# Patient Record
Sex: FEMALE | Race: WHITE | NOT HISPANIC OR LATINO | Employment: FULL TIME | URBAN - METROPOLITAN AREA
[De-identification: names, ages, dates, MRNs, and addresses within clinical notes are randomized per-mention and may not be internally consistent; named-entity substitution may affect disease eponyms.]

---

## 2017-05-19 ENCOUNTER — OFFICE VISIT (OUTPATIENT)
Dept: URGENT CARE | Facility: PHYSICIAN GROUP | Age: 57
End: 2017-05-19
Payer: COMMERCIAL

## 2017-05-19 VITALS
WEIGHT: 180 LBS | DIASTOLIC BLOOD PRESSURE: 84 MMHG | BODY MASS INDEX: 28.19 KG/M2 | SYSTOLIC BLOOD PRESSURE: 122 MMHG | OXYGEN SATURATION: 95 % | HEART RATE: 80 BPM | RESPIRATION RATE: 18 BRPM | TEMPERATURE: 98.4 F

## 2017-05-19 DIAGNOSIS — N39.0 ACUTE URINARY TRACT INFECTION: ICD-10-CM

## 2017-05-19 LAB
APPEARANCE UR: CLEAR
BILIRUB UR STRIP-MCNC: NORMAL MG/DL
COLOR UR AUTO: YELLOW
GLUCOSE UR STRIP.AUTO-MCNC: NORMAL MG/DL
KETONES UR STRIP.AUTO-MCNC: NORMAL MG/DL
LEUKOCYTE ESTERASE UR QL STRIP.AUTO: NORMAL
NITRITE UR QL STRIP.AUTO: NORMAL
PH UR STRIP.AUTO: 6 [PH] (ref 5–8)
PROT UR QL STRIP: NORMAL MG/DL
RBC UR QL AUTO: NORMAL
SP GR UR STRIP.AUTO: 1.01
UROBILINOGEN UR STRIP-MCNC: NORMAL MG/DL

## 2017-05-19 PROCEDURE — 99214 OFFICE O/P EST MOD 30 MIN: CPT | Performed by: FAMILY MEDICINE

## 2017-05-19 PROCEDURE — 81002 URINALYSIS NONAUTO W/O SCOPE: CPT | Performed by: FAMILY MEDICINE

## 2017-05-19 RX ORDER — CHLORAL HYDRATE 500 MG
1000 CAPSULE ORAL
COMMUNITY
End: 2019-06-13

## 2017-05-19 RX ORDER — CIPROFLOXACIN 500 MG/1
TABLET, FILM COATED ORAL
Qty: 14 TAB | Refills: 0 | Status: SHIPPED | OUTPATIENT
Start: 2017-05-19 | End: 2019-06-13

## 2017-05-19 NOTE — PROGRESS NOTES
Chief Complaint:    Chief Complaint   Patient presents with   • Dysuria     x 3 days       History of Present Illness:    This is a new problem. Symptoms x 3 days. Has discomfort in bladder region, dysuria at end of urination, urinary frequency, and urinary urgency. No gross hematuria. Feels like typical UTI. 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 She was appropriately treated for UTI 10/24/16 with Bactrim but developed rash and then was rx'd Cipro to replace Bactrim. Cipro worked/was tolerated.    Positive urine culture 10/24/16 noted below:     URINE CULTURE(NEW) (Order #[id number]) on 10/24/16       Result Notes      Notes Recorded by Chris Damon PA-C on 10/27/2016 at 4:27 PM  Pt on appropriate antibiotic                Component Results      Component     Significant Indicator     POS     Source     UR     Urine Culture (Abnormal)     Mixed skin angelica 10-50,000 cfu/mL     Urine Culture (Abnormal)     Escherichia coli   ,000 cfu/mL          Narrative      Patient weight (in lbs)->0  Total urine volume units?->ML       Lab Information      Lab     [facility]                  Last Resulted Time     Thu Oct 27, 2016  8:03 AM       Detailed Information      Priority and Order Details Collection Information       Collection Information      Collected: 10/24/2016  4:20 PM    Resulting Agency: [facility]          Culture & Susceptibility      ESCHERICHIA COLI      Antibiotic Sensitivity Microscan Unit Status     Ampicillin Sensitive <=8 mcg/mL Final     Cefepime Sensitive <=8 mcg/mL Final     Cefotaxime Sensitive <=2 mcg/mL Final     Cefotetan Sensitive <=16 mcg/mL Final     Ceftazidime Sensitive <=1 mcg/mL Final     Ceftriaxone Sensitive <=8 mcg/mL Final     Cefuroxime Sensitive 8 mcg/mL Final     Cephalothin Sensitive <=8 mcg/mL Final     Ciprofloxacin Sensitive <=1 mcg/mL Final     Gentamicin Sensitive <=4 mcg/mL  Final     Levofloxacin Sensitive <=2 mcg/mL Final     Nitrofurantoin Sensitive <=32 mcg/mL Final     Pip/Tazobactam Sensitive <=16 mcg/mL Final     Piperacillin Sensitive <=16 mcg/mL Final     Tigecycline Sensitive <=2 mcg/mL Final     Tobramycin Sensitive <=4 mcg/mL Final     Trimeth/Sulfa Sensitive <=2/38 mcg/mL Final                   Review of Systems:    Constitutional: Negative for fever, chills, and diaphoresis.   Eyes: Negative for change in vision, photophobia, pain, redness, and discharge.  ENT: Negative for ear pain, ear discharge, hearing loss, tinnitus, nasal congestion, nosebleeds, and sore throat.    Respiratory: Negative for cough, hemoptysis, sputum production, shortness of breath, wheezing, and stridor.    Cardiovascular: Negative for chest pain, palpitations, orthopnea, claudication, leg swelling, and PND.   Gastrointestinal: Negative for abdominal pain, nausea, vomiting, diarrhea, constipation, blood in stool, and melena.   Genitourinary: See HPI.  Musculoskeletal: Negative for myalgias, joint pain, neck pain, and back pain.   Skin: Negative for rash and itching.   Neurological: Negative for dizziness, tingling, tremors, sensory change, speech change, focal weakness, seizures, loss of consciousness, and headaches.   Endo: Negative for polydipsia.   Heme: Does not bruise/bleed easily.   Psychiatric/Behavioral: No new symptoms.    Past Medical History:    Past Medical History   Diagnosis Date   • Unspecified urinary incontinence    • Other specified symptom associated with female genital organs    • Renal disorder      not an issue at this (from NSAIDS 2010)   • Psychiatric problem      depression       Past Surgical History:    Past Surgical History   Procedure Laterality Date   • Other  1968     T&A uvula removal   • Hysterectomy robotic  4/9/2013     Performed by Juanita Wade M.D. at SURGERY [facility]   • Bladder sling female  4/9/2013     Performed by Juanita Wade M.D. at  SURGERY [facility]S   • Cystoscopy  4/9/2013     Performed by Juanita Wade M.D. at SURGERY [facility]   • Appendectomy laparoscopic  11/13/2014     Performed by Alethea Mike M.D. at SURGERY [facility]       Social History:    Social History     Social History   • Marital Status:      Spouse Name: N/A   • Number of Children: N/A   • Years of Education: N/A     Occupational History   • Not on file.     Social History Main Topics   • Smoking status: Former Smoker -- 0.50 packs/day for 10 years     Types: Cigarettes     Quit date: 01/01/1992   • Smokeless tobacco: Not on file   • Alcohol Use: Yes      Comment: 0-2 per week   • Drug Use: No   • Sexual Activity: Not on file     Other Topics Concern   • Not on file     Social History Narrative       Family History:    History reviewed. No pertinent family history.    Medications:    Current Outpatient Prescriptions on File Prior to Visit   Medication Sig Dispense Refill   • ESTROGENS CONJUGATED PO Take 1 mg by mouth. estrogile     • Multiple Vitamin (MULTI-VITAMIN PO) Take  by mouth every day.     • Flaxseed, Linseed, (FLAX SEED OIL PO) Take 1 Cap by mouth every day.     • duloxetine (CYMBALTA) 20 MG CPEP Take 60 mg by mouth every day.     • cetirizine (ZYRTEC) 10 MG TABS Take 10 mg by mouth every day.     • montelukast (SINGULAIR) 10 MG TABS Take 10 mg by mouth every day.       No current facility-administered medications on file prior to visit.       Allergies:    Allergies   Allergen Reactions   • Bactrim [Sulfamethoxazole W-Trimethoprim] Rash     Hands to forearms B/L rash since starting Bactrim   • Codeine Vomiting   • Vicodin [Hydrocodone-Acetaminophen] Vomiting   • Erythromycin Vomiting   • Nsaids      Decreased kidney function         Vitals:    Filed Vitals:    05/19/17 0950   BP: 122/84   Pulse: 80   Temp: 36.9 °C (98.4 °F)   Resp: 18   Weight: 81.647 kg (180 lb)   SpO2: 95%       Physical Exam:    Constitutional: Vital signs  reviewed. Appears well-developed and well-nourished. No acute distress.   Eyes: Sclera white, conjunctivae clear.   ENT: External ears normal. Hearing normal.  Neck: Neck supple.   Pulmonary/Chest: Respirations non-labored.   Abdomen: Bowel sounds are normal active. Soft, non-distended, and non-tender to palpation.    Musculoskeletal: No CVA TTP bilaterally. Normal gait. Normal range of motion. No muscular atrophy or weakness.  Neurological: Alert and oriented to person, place, and time. Muscle tone normal. Coordination normal. Light touch and sensation normal.   Skin: No rashes or lesions. Warm, dry, normal turgor.  Psychiatric: Normal mood and affect. Behavior is normal. Judgment and thought content normal.     Diagnostics:     POCT URINALYSIS (Order #[id number]) on 5/19/17       Component Results      Component Value Ref Range & Units Status     POC Color yellow Negative Final     POC Appearance clear Negative Final     POC Leukocyte Esterase neg Negative Final     POC Nitrites neg Negative Final     POC Urobiligen neg Negative (0.2) mg/dL Final     POC Protein neg Negative mg/dL Final     POC Urine PH 6.0 5.0 - 8.0 Final     POC Blood trace Negative Final     POC Specific Gravity 1.010 <1.005 - >1.030 Final     POC Ketones neg Negative mg/dL Final     POC Biliruben neg Negative mg/dL Final     POC Glucose neg Negative mg/dL Final         Last Resulted Time     Fri May 19, 2017 10:08 AM       Assessment / Plan:    1. Acute urinary tract infection  - POCT Urinalysis  - ciprofloxacin (CIPRO) 500 MG Tab; 1 TAB TWICE A DAY X 5-7 DAYS.  Dispense: 14 Tab; Refill: 0      Discussed with her limitations of U. dip, DDX, and management options.    Despite not overly remarkable U. dip today, she has all the right symptoms for UTI, and I rec'd treat with antibiotic. She agrees.    She was appropriately treated for UTI 10/24/16 with Bactrim but developed rash and then was rx'd Cipro to replace Bactrim. Cipro worked/was  tolerated.    As she tolerated Cipro, will go with Cipro as there is potential for adverse reaction to other med she has not had in past.    Agreeable to medication prescribed.    Declines urine culture.    Follow-up with PCP or urgent care if getting worse or not better with above.

## 2017-05-19 NOTE — MR AVS SNAPSHOT
Reta Merrill Horvath   2017 9:30 AM   Office Visit   MRN: [id number]    Department:  [facility]   Dept Phone:  [phone]    Description:  Female : 1960   Provider:  Jovan Ruiz M.D.           Reason for Visit     Dysuria x 3 days      Allergies as of 2017     Allergen Noted Reactions    Bactrim [Sulfamethoxazole W-Trimethoprim] 10/26/2016   Rash    Hands to forearms B/L rash since starting Bactrim    Codeine 2012   Vomiting    Vicodin [Hydrocodone-Acetaminophen] 2012   Vomiting    Erythromycin 2013   Vomiting    Nsaids 2013       Decreased kidney function      You were diagnosed with     Acute urinary tract infection   [609905]         Vital Signs     Blood Pressure Pulse Temperature Respirations Weight Oxygen Saturation    122/84 mmHg 80 36.9 °C (98.4 °F) 18 81.647 kg (180 lb) 95%    Smoking Status                   Former Smoker           Basic Information     Date Of Birth Sex Race Ethnicity Preferred Language    1960 Female White Non- English      Problem List              ICD-10-CM Priority Class Noted - Resolved    Health examination of defined subpopulation Z02.89   2012 - Present    Fibroid uterus D25.9   2013 - Present      Health Maintenance        Date Due Completion Dates    IMM DTaP/Tdap/Td Vaccine (1 - Tdap) 1981 ---    PAP SMEAR 1983 ---    MAMMOGRAM 12/10/2016 12/10/2015, 2015, 2015, 2011    COLONOSCOPY 10/31/2024 10/31/2014            Current Immunizations     Influenza TIV (IM) 10/10/2013, 10/4/2012    Influenza Vaccine Adult HD 10/10/2013    Influenza Vaccine Quad Inj (Pf) 2016, 2014    Influenza Vaccine Quad Inj (Preserved) 10/30/2015    Tuberculin Skin Test 3/17/2017, 3/21/2016, 2015  8:05 AM, 2014 10:40 AM, 2013 11:17 AM, 2013  9:52 AM, 8/15/2012  4:05 PM, 2012  3:28 PM      Below and/or attached are the medications your provider expects you to take. Review all  of your home medications and newly ordered medications with your provider and/or pharmacist. Follow medication instructions as directed by your provider and/or pharmacist. Please keep your medication list with you and share with your provider. Update the information when medications are discontinued, doses are changed, or new medications (including over-the-counter products) are added; and carry medication information at all times in the event of emergency situations     Allergies:  BACTRIM - Rash     CODEINE - Vomiting     VICODIN - Vomiting     ERYTHROMYCIN - Vomiting     NSAIDS - (reactions not documented)               Medications  Valid as of: May 19, 2017 - 10:07 AM    Generic Name Brand Name Tablet Size Instructions for use    Calcium-Magnesium-Vitamin D   Take  by mouth.        Cetirizine HCl (Tab) ZYRTEC 10 MG Take 10 mg by mouth every day.        Ciprofloxacin HCl (Tab) CIPRO 500 MG 1 TAB TWICE A DAY X 5-7 DAYS.        DULoxetine HCl (Cap DR Particles) CYMBALTA 20 MG Take 60 mg by mouth every day.        Estrogens Conjugated   Take 1 mg by mouth. estrogile        Flaxseed (Linseed)   Take 1 Cap by mouth every day.        Montelukast Sodium (Tab) SINGULAIR 10 MG Take 10 mg by mouth every day.        Multiple Vitamin   Take  by mouth every day.        Omega-3 Fatty Acids (Cap) fish oil 1000 MG Take 1,000 mg by mouth 3 times a day, with meals.        .                 Medicines prescribed today were sent to:     [facility] [place] - [address]    [address] [facility] [address]    Phone: [phone] Fax: [phone]    Open 24 Hours?: No    [id number] [address] [place] [address] [address]    Phone: [phone] Fax: [phone]    Open 24 Hours?: No      Medication refill instructions:       If your prescription bottle indicates you have medication refills left, it is not necessary to call your  provider’s office. Please contact your pharmacy and they will refill your medication.    If your prescription bottle indicates you do not have any refills left, you may request refills at any time through one of the following ways: The online [facility] system (except Urgent Care), by calling your provider’s office, or by asking your pharmacy to contact your provider’s office with a refill request. Medication refills are processed only during regular business hours and may not be available until the next business day. Your provider may request additional information or to have a follow-up visit with you prior to refilling your medication.   *Please Note: Medication refills are assigned a new Rx number when refilled electronically. Your pharmacy may indicate that no refills were authorized even though a new prescription for the same medication is available at the pharmacy. Please request the medicine by name with the pharmacy before contacting your provider for a refill.           [facility] Access Code: Activation code not generated  Current [facility] Status: Active

## 2017-09-09 ENCOUNTER — TELEPHONE (OUTPATIENT)
Dept: OCCUPATIONAL MEDICINE | Facility: CLINIC | Age: 57
End: 2017-09-09

## 2017-09-09 NOTE — TELEPHONE ENCOUNTER
Phone Number Called: [phone]    Message: Called pt. Left a vm to called back to [phone]    Left Message for patient to call back: yes

## 2017-09-25 ENCOUNTER — HOSPITAL ENCOUNTER (OUTPATIENT)
Dept: LAB | Facility: MEDICAL CENTER | Age: 57
End: 2017-09-25
Payer: COMMERCIAL

## 2017-09-25 LAB
BDY FAT % MEASURED: 40.7 %
BP DIAS: 71 MMHG
BP SYS: 135 MMHG
CHOLEST SERPL-MCNC: 250 MG/DL (ref 100–199)
DIABETES HTDIA: NO
EVENT NAME HTEVT: NORMAL
FASTING HTFAS: YES
GLUCOSE SERPL-MCNC: 92 MG/DL (ref 65–99)
HDLC SERPL-MCNC: 58 MG/DL
HYPERTENSION HTHYP: NO
LDLC SERPL CALC-MCNC: 144 MG/DL
SCREENING LOC CITY HTCIT: NORMAL
SCREENING LOC STATE HTSTA: NORMAL
SCREENING LOCATION HTLOC: NORMAL
SMOKING HTSMO: NO
SUBSCRIBER ID HTSID: NORMAL
TRIGL SERPL-MCNC: 241 MG/DL (ref 0–149)

## 2017-09-25 PROCEDURE — 36415 COLL VENOUS BLD VENIPUNCTURE: CPT

## 2017-09-25 PROCEDURE — S5190 WELLNESS ASSESSMENT BY NONPH: HCPCS

## 2017-09-25 PROCEDURE — 82947 ASSAY GLUCOSE BLOOD QUANT: CPT

## 2017-09-25 PROCEDURE — 80061 LIPID PANEL: CPT

## 2017-09-28 ENCOUNTER — EH NON-PROVIDER (OUTPATIENT)
Dept: OCCUPATIONAL MEDICINE | Facility: CLINIC | Age: 57
End: 2017-09-28

## 2017-09-28 DIAGNOSIS — Z29.89 NEED FOR ISOLATION: ICD-10-CM

## 2017-09-28 PROCEDURE — 94375 RESPIRATORY FLOW VOLUME LOOP: CPT

## 2018-02-20 ENCOUNTER — OFFICE VISIT (OUTPATIENT)
Dept: URGENT CARE | Facility: PHYSICIAN GROUP | Age: 58
End: 2018-02-20
Payer: COMMERCIAL

## 2018-02-20 VITALS
WEIGHT: 190 LBS | HEIGHT: 68 IN | BODY MASS INDEX: 28.79 KG/M2 | DIASTOLIC BLOOD PRESSURE: 76 MMHG | HEART RATE: 100 BPM | SYSTOLIC BLOOD PRESSURE: 112 MMHG | OXYGEN SATURATION: 98 % | TEMPERATURE: 98.8 F | RESPIRATION RATE: 16 BRPM

## 2018-02-20 DIAGNOSIS — J01.00 ACUTE NON-RECURRENT MAXILLARY SINUSITIS: ICD-10-CM

## 2018-02-20 PROCEDURE — 99214 OFFICE O/P EST MOD 30 MIN: CPT | Performed by: PHYSICIAN ASSISTANT

## 2018-02-20 RX ORDER — BENZONATATE 100 MG/1
100 CAPSULE ORAL 3 TIMES DAILY PRN
Qty: 60 CAP | Refills: 0 | Status: SHIPPED | OUTPATIENT
Start: 2018-02-20 | End: 2019-06-13

## 2018-02-20 RX ORDER — AZITHROMYCIN 250 MG/1
TABLET, FILM COATED ORAL
Qty: 6 TAB | Refills: 0 | Status: SHIPPED | OUTPATIENT
Start: 2018-02-20 | End: 2019-06-13

## 2018-02-20 ASSESSMENT — ENCOUNTER SYMPTOMS
CHILLS: 1
HEADACHES: 1
SORE THROAT: 1
FEVER: 1
MYALGIAS: 1
COUGH: 1

## 2018-02-20 NOTE — PROGRESS NOTES
Subjective:      Reta Horvath is a 57 y.o. female who presents with Cough (cough, congestion, sore throat x1 week)            HPI  Chief Complaint   Patient presents with   • Cough     cough, congestion, sore throat x1 week       HPI:  Reta Horvath is a 57 y.o. female who presents with URI symptoms and cough now for 1 week.  Did get the flu vaccine.  Sinus drainage is now yellow and thicker. Worsening pressure in the face. Symptoms generally worsening over the last 1-2 days. Patient denies  SOB, chest pain, palpitations,  or n/v/d.      Past Medical History:   Diagnosis Date   • Other specified symptom associated with female genital organs    • Psychiatric problem     depression   • Renal disorder     not an issue at this (from NSAIDS 2010)   • Unspecified urinary incontinence        Past Surgical History:   Procedure Laterality Date   • APPENDECTOMY LAPAROSCOPIC  11/13/2014    Performed by Alethea Mike M.D. at SURGERY [facility]   • HYSTERECTOMY ROBOTIC  4/9/2013    Performed by Juanita Wade M.D. at SURGERY [facility]   • BLADDER SLING FEMALE  4/9/2013    Performed by Juanita Wade M.D. at SURGERY [facility]   • CYSTOSCOPY  4/9/2013    Performed by Juanita Wade M.D. at SURGERY [facility]   • OTHER  1968    T&A uvula removal       No family history on file.  No pertinent family history.    Social History     Social History   • Marital status:      Spouse name: N/A   • Number of children: N/A   • Years of education: N/A     Occupational History   • Not on file.     Social History Main Topics   • Smoking status: Former Smoker     Packs/day: 0.50     Years: 10.00     Types: Cigarettes     Quit date: 1/1/1992   • Smokeless tobacco: Never Used   • Alcohol use Yes      Comment: 0-2 per week   • Drug use: No   • Sexual activity: Not on file     Other Topics Concern   • Not on file     Social History Narrative   • No narrative on file         Current Outpatient  "Prescriptions:   •  Calcium-Magnesium-Vitamin D (CALCIUM 500 PO), Take  by mouth., 2/20/2018  •  DULoxetine, 60 mg, Oral, DAILY, 2/20/2018  •  montelukast, 10 mg, Oral, DAILY, 2/20/2018  •  ciprofloxacin, 1 TAB TWICE A DAY X 5-7 DAYS.  •  fish oil, 1,000 mg, Oral, TID WITH MEALS  •  ESTROGENS CONJUGATED PO, Take 1 mg by mouth. estrogile, not taking at Unknown  •  Multiple Vitamin (MULTI-VITAMIN PO), Take  by mouth every day.  •  Flaxseed, Linseed, (FLAX SEED OIL PO), 1 Cap, Oral, DAILY, Not Taking at Unknown  •  cetirizine, 10 mg, Oral, DAILY, not taking at 0900    Allergies   Allergen Reactions   • Bactrim [Sulfamethoxazole W-Trimethoprim] Rash     Hands to forearms B/L rash since starting Bactrim   • Codeine Vomiting   • Vicodin [Hydrocodone-Acetaminophen] Vomiting   • Erythromycin Vomiting   • Nsaids      Decreased kidney function        Review of Systems   Constitutional: Positive for chills, fever and malaise/fatigue.   HENT: Positive for congestion and sore throat.    Respiratory: Positive for cough.    Musculoskeletal: Positive for myalgias.   Skin: Negative.    Neurological: Positive for headaches.   All other systems reviewed and are negative.         Objective:     There were no vitals taken for this visit.   Ambulatory Vitals  Encounter Vitals  Temperature: 37.1 °C (98.8 °F)  Temp Source: Temporal  Blood Pressure: 112/76  BP Location: Left, Upper Arm  Patient BP Position: Sitting  Pulse: 100  Respiration: 16  Pulse Oximetry: 98 %  Weight: 86.2 kg (190 lb)  Height: 172.7 cm (5' 8\")  BMI (Calculated): 28.89    Physical Exam       Physical Exam   Nursing note and vitals reviewed.    Constitutional:   Appropriately groomed, pleasant affect, well nourished, in NAD.    Head:   Normocephalic, atraumatic.    Eyes:   PERRLA, EOM's full, sclera white, conjunctiva not erythematous, and medial canthus without exudate bilaterally.    Ears:  Auricle and tragus non-tender to manipulation.  No pre-auricular " lymphadenopathy or mastoid ttp.  EACs with mild cerumen bilaterally, not erythematous.  TM’s pearly gray with cone of light present and umbo and malleolus visible bilaterally.  No bulging or fluid bubbles present in middle ear.  Hearing grossly intact to voice.    Nose:  Nares not patent bilaterally.  Nasal mucosa edematous with yellow-white rhinorrhea bilaterally. Maxillary sinuses tender to percussion bilaterally.    Throat:  Dentition wnl, mucosa moist without lesions.  Oropharynx erythematous, with no enlargement of the palatine tonsils bilaterally with no exudates.    Post nasal drainage present.  Soft palate rises symmetrically bilaterally and uvula midline.      Neck: Neck supple, with mild anterior lymphadenopathy that is soft and mobile to palpation. Thyroid non-palpable without tenderness or nodules. No supraclavicular lymphadenopathy.    Lungs:  Respiratory effort not labored without accessory muscle use.  Lungs clear to auscultation bilaterally without wheezes or rales. Rhonchi clear to cough.    Heart:  RRR, without murmurs rubs or gallops.  Radial and dorsalis pedis pulse 2+ bilaterally.  No LE edema.    Musculoskeletal:  Gait non-antalgic with a narrow base.    Derm:  Skin without rashes or lesions with good turgor pressure.      Psychiatric:  Mood, affect, and judgement appropriate.        Assessment/Plan:     1. Acute non-recurrent maxillary sinusitis  azithromycin (ZITHROMAX) 250 MG Tab    benzonatate (TESSALON) 100 MG Cap     Patient presents with one week of sinus congestion and pressure and nonproductive cough. Also did have fever and bodyaches initially. Suggestive of influenza. Now 7 days out and worsening again with the cranial drainage. Concern for early bacterial rhinosinusitis. Prescribed azithromycin and Tessalon Perles. Work note provided.    Patient was in agreement with this treatment plan and seemed to understand without barriers. All questions were encouraged and answered.  Reviewed  signs and symptoms of when to seek emergency medical care.     Please note that this dictation was created using voice recognition software.  I have made every reasonable attempt to correct obvious errors, but I expect there are errors of hakan and possibly content that I did not discover before finalizing the note.

## 2018-02-20 NOTE — LETTER
February 20, 2018         Patient: Reta Horvath   YOB: 1960   Date of Visit: 2/20/2018           To Whom it May Concern:    Reta Horvath was seen in my clinic on 2/20/2018. Please excuse her from work 2/20.    If you have any questions or concerns, please don't hesitate to call.        Sincerely,           Feng Newberry P.A.-C.  Electronically Signed

## 2018-06-12 ENCOUNTER — HOSPITAL ENCOUNTER (OUTPATIENT)
Dept: RADIOLOGY | Facility: MEDICAL CENTER | Age: 58
End: 2018-06-12
Attending: FAMILY MEDICINE
Payer: COMMERCIAL

## 2018-06-12 DIAGNOSIS — Z00.00 PREVENTATIVE HEALTH CARE: ICD-10-CM

## 2018-06-12 PROCEDURE — 77080 DXA BONE DENSITY AXIAL: CPT

## 2018-06-12 PROCEDURE — 77067 SCR MAMMO BI INCL CAD: CPT

## 2018-08-10 ENCOUNTER — DOCUMENTATION (OUTPATIENT)
Dept: OCCUPATIONAL MEDICINE | Facility: CLINIC | Age: 58
End: 2018-08-10

## 2018-08-10 NOTE — PROGRESS NOTES
Respiratory questionnaire reviewed and signed. See scanned documents.      OK to be fit at mask event.

## 2018-08-16 ENCOUNTER — HOSPITAL ENCOUNTER (OUTPATIENT)
Dept: LAB | Facility: MEDICAL CENTER | Age: 58
End: 2018-08-16
Attending: FAMILY MEDICINE
Payer: COMMERCIAL

## 2018-08-16 LAB
ALBUMIN SERPL BCP-MCNC: 4.5 G/DL (ref 3.2–4.9)
ALBUMIN/GLOB SERPL: 1.5 G/DL
ALP SERPL-CCNC: 107 U/L (ref 30–99)
ALT SERPL-CCNC: 12 U/L (ref 2–50)
ANION GAP SERPL CALC-SCNC: 7 MMOL/L (ref 0–11.9)
AST SERPL-CCNC: 24 U/L (ref 12–45)
BASOPHILS # BLD AUTO: 1.6 % (ref 0–1.8)
BASOPHILS # BLD: 0.08 K/UL (ref 0–0.12)
BILIRUB SERPL-MCNC: 0.5 MG/DL (ref 0.1–1.5)
BUN SERPL-MCNC: 19 MG/DL (ref 8–22)
CALCIUM SERPL-MCNC: 9.7 MG/DL (ref 8.5–10.5)
CHLORIDE SERPL-SCNC: 104 MMOL/L (ref 96–112)
CHOLEST SERPL-MCNC: 296 MG/DL (ref 100–199)
CO2 SERPL-SCNC: 27 MMOL/L (ref 20–33)
CREAT SERPL-MCNC: 0.96 MG/DL (ref 0.5–1.4)
EOSINOPHIL # BLD AUTO: 0.17 K/UL (ref 0–0.51)
EOSINOPHIL NFR BLD: 3.4 % (ref 0–6.9)
ERYTHROCYTE [DISTWIDTH] IN BLOOD BY AUTOMATED COUNT: 44.9 FL (ref 35.9–50)
GLOBULIN SER CALC-MCNC: 3.1 G/DL (ref 1.9–3.5)
GLUCOSE SERPL-MCNC: 103 MG/DL (ref 65–99)
HCT VFR BLD AUTO: 42.5 % (ref 37–47)
HDLC SERPL-MCNC: 65 MG/DL
HGB BLD-MCNC: 13.6 G/DL (ref 12–16)
IMM GRANULOCYTES # BLD AUTO: 0.01 K/UL (ref 0–0.11)
IMM GRANULOCYTES NFR BLD AUTO: 0.2 % (ref 0–0.9)
LDLC SERPL CALC-MCNC: 168 MG/DL
LYMPHOCYTES # BLD AUTO: 1.89 K/UL (ref 1–4.8)
LYMPHOCYTES NFR BLD: 37.5 % (ref 22–41)
MCH RBC QN AUTO: 29.1 PG (ref 27–33)
MCHC RBC AUTO-ENTMCNC: 32 G/DL (ref 33.6–35)
MCV RBC AUTO: 90.8 FL (ref 81.4–97.8)
MONOCYTES # BLD AUTO: 0.44 K/UL (ref 0–0.85)
MONOCYTES NFR BLD AUTO: 8.7 % (ref 0–13.4)
NEUTROPHILS # BLD AUTO: 2.45 K/UL (ref 2–7.15)
NEUTROPHILS NFR BLD: 48.6 % (ref 44–72)
NRBC # BLD AUTO: 0 K/UL
NRBC BLD-RTO: 0 /100 WBC
PLATELET # BLD AUTO: 366 K/UL (ref 164–446)
PMV BLD AUTO: 9.8 FL (ref 9–12.9)
POTASSIUM SERPL-SCNC: 4.2 MMOL/L (ref 3.6–5.5)
PROT SERPL-MCNC: 7.6 G/DL (ref 6–8.2)
RBC # BLD AUTO: 4.68 M/UL (ref 4.2–5.4)
SODIUM SERPL-SCNC: 138 MMOL/L (ref 135–145)
TRIGL SERPL-MCNC: 315 MG/DL (ref 0–149)
TSH SERPL DL<=0.005 MIU/L-ACNC: 1.74 UIU/ML (ref 0.38–5.33)
WBC # BLD AUTO: 5 K/UL (ref 4.8–10.8)

## 2018-08-16 PROCEDURE — 80053 COMPREHEN METABOLIC PANEL: CPT

## 2018-08-16 PROCEDURE — 36415 COLL VENOUS BLD VENIPUNCTURE: CPT

## 2018-08-16 PROCEDURE — 80061 LIPID PANEL: CPT

## 2018-08-16 PROCEDURE — 84443 ASSAY THYROID STIM HORMONE: CPT

## 2018-08-16 PROCEDURE — 85025 COMPLETE CBC W/AUTO DIFF WBC: CPT

## 2018-09-15 ENCOUNTER — EH NON-PROVIDER (OUTPATIENT)
Dept: OCCUPATIONAL MEDICINE | Facility: CLINIC | Age: 58
End: 2018-09-15

## 2018-09-15 DIAGNOSIS — Z02.89 ENCOUNTER FOR OCCUPATIONAL HEALTH EXAMINATION INVOLVING RESPIRATOR: Primary | ICD-10-CM

## 2018-09-15 PROCEDURE — 94375 RESPIRATORY FLOW VOLUME LOOP: CPT | Performed by: PREVENTIVE MEDICINE

## 2018-09-19 ENCOUNTER — IMMUNIZATION (OUTPATIENT)
Dept: OCCUPATIONAL MEDICINE | Facility: CLINIC | Age: 58
End: 2018-09-19

## 2018-09-19 DIAGNOSIS — Z23 NEED FOR VACCINATION: ICD-10-CM

## 2018-09-22 PROCEDURE — 90686 IIV4 VACC NO PRSV 0.5 ML IM: CPT | Performed by: PREVENTIVE MEDICINE

## 2019-06-12 ENCOUNTER — HOSPITAL ENCOUNTER (OUTPATIENT)
Dept: LAB | Facility: MEDICAL CENTER | Age: 59
End: 2019-06-12
Payer: COMMERCIAL

## 2019-06-12 ENCOUNTER — HOSPITAL ENCOUNTER (OUTPATIENT)
Dept: LAB | Facility: MEDICAL CENTER | Age: 59
End: 2019-06-12
Attending: FAMILY MEDICINE
Payer: COMMERCIAL

## 2019-06-12 LAB
ALBUMIN SERPL BCP-MCNC: 4.3 G/DL (ref 3.2–4.9)
ALBUMIN/GLOB SERPL: 1.5 G/DL
ALP SERPL-CCNC: 95 U/L (ref 30–99)
ALT SERPL-CCNC: 9 U/L (ref 2–50)
ANION GAP SERPL CALC-SCNC: 5 MMOL/L (ref 0–11.9)
AST SERPL-CCNC: 20 U/L (ref 12–45)
BDY FAT % MEASURED: 40.7 %
BILIRUB SERPL-MCNC: 0.5 MG/DL (ref 0.1–1.5)
BP DIAS: 62 MMHG
BP SYS: 128 MMHG
BUN SERPL-MCNC: 20 MG/DL (ref 8–22)
CALCIUM SERPL-MCNC: 9.5 MG/DL (ref 8.5–10.5)
CHLORIDE SERPL-SCNC: 105 MMOL/L (ref 96–112)
CHOLEST SERPL-MCNC: 254 MG/DL (ref 100–199)
CHOLEST SERPL-MCNC: 260 MG/DL (ref 100–199)
CO2 SERPL-SCNC: 29 MMOL/L (ref 20–33)
CREAT SERPL-MCNC: 1.04 MG/DL (ref 0.5–1.4)
DIABETES HTDIA: NO
EVENT NAME HTEVT: NORMAL
FASTING HTFAS: YES
FASTING STATUS PATIENT QL REPORTED: NORMAL
FASTING STATUS PATIENT QL REPORTED: NORMAL
GLOBULIN SER CALC-MCNC: 2.9 G/DL (ref 1.9–3.5)
GLUCOSE SERPL-MCNC: 101 MG/DL (ref 65–99)
GLUCOSE SERPL-MCNC: 97 MG/DL (ref 65–99)
HDLC SERPL-MCNC: 52 MG/DL
HDLC SERPL-MCNC: 55 MG/DL
HYPERTENSION HTHYP: NO
LDLC SERPL CALC-MCNC: 159 MG/DL
LDLC SERPL CALC-MCNC: 162 MG/DL
POTASSIUM SERPL-SCNC: 4.2 MMOL/L (ref 3.6–5.5)
PROT SERPL-MCNC: 7.2 G/DL (ref 6–8.2)
SCREENING LOC CITY HTCIT: NORMAL
SCREENING LOC STATE HTSTA: NORMAL
SCREENING LOCATION HTLOC: NORMAL
SMOKING HTSMO: NO
SODIUM SERPL-SCNC: 139 MMOL/L (ref 135–145)
SUBSCRIBER ID HTSID: NORMAL
TRIGL SERPL-MCNC: 213 MG/DL (ref 0–149)
TRIGL SERPL-MCNC: 216 MG/DL (ref 0–149)

## 2019-06-12 PROCEDURE — S5190 WELLNESS ASSESSMENT BY NONPH: HCPCS

## 2019-06-12 PROCEDURE — 80061 LIPID PANEL: CPT | Mod: 91

## 2019-06-12 PROCEDURE — 80053 COMPREHEN METABOLIC PANEL: CPT

## 2019-06-12 PROCEDURE — 80061 LIPID PANEL: CPT

## 2019-06-12 PROCEDURE — 36415 COLL VENOUS BLD VENIPUNCTURE: CPT

## 2019-06-12 PROCEDURE — 82947 ASSAY GLUCOSE BLOOD QUANT: CPT

## 2019-06-13 ENCOUNTER — HOSPITAL ENCOUNTER (OUTPATIENT)
Facility: MEDICAL CENTER | Age: 59
End: 2019-06-13
Attending: FAMILY MEDICINE
Payer: COMMERCIAL

## 2019-06-13 ENCOUNTER — OFFICE VISIT (OUTPATIENT)
Dept: URGENT CARE | Facility: MEDICAL CENTER | Age: 59
End: 2019-06-13
Payer: COMMERCIAL

## 2019-06-13 VITALS
TEMPERATURE: 98.1 F | DIASTOLIC BLOOD PRESSURE: 76 MMHG | HEART RATE: 89 BPM | OXYGEN SATURATION: 100 % | SYSTOLIC BLOOD PRESSURE: 120 MMHG | HEIGHT: 68 IN | WEIGHT: 214.4 LBS | BODY MASS INDEX: 32.49 KG/M2

## 2019-06-13 DIAGNOSIS — R53.83 MALAISE AND FATIGUE: ICD-10-CM

## 2019-06-13 DIAGNOSIS — R53.81 MALAISE AND FATIGUE: ICD-10-CM

## 2019-06-13 LAB
APPEARANCE UR: CLEAR
BILIRUB UR STRIP-MCNC: NEGATIVE MG/DL
COLOR UR AUTO: YELLOW
GLUCOSE UR STRIP.AUTO-MCNC: NEGATIVE MG/DL
KETONES UR STRIP.AUTO-MCNC: NORMAL MG/DL
LEUKOCYTE ESTERASE UR QL STRIP.AUTO: NEGATIVE
NITRITE UR QL STRIP.AUTO: NEGATIVE
PH UR STRIP.AUTO: 5.5 [PH] (ref 5–8)
PROT UR QL STRIP: NEGATIVE MG/DL
RBC UR QL AUTO: NORMAL
SP GR UR STRIP.AUTO: >=1.03
UROBILINOGEN UR STRIP-MCNC: 0.2 MG/DL

## 2019-06-13 PROCEDURE — 81002 URINALYSIS NONAUTO W/O SCOPE: CPT | Performed by: FAMILY MEDICINE

## 2019-06-13 PROCEDURE — 87086 URINE CULTURE/COLONY COUNT: CPT

## 2019-06-13 PROCEDURE — 99214 OFFICE O/P EST MOD 30 MIN: CPT | Performed by: FAMILY MEDICINE

## 2019-06-13 RX ORDER — NITROFURANTOIN 25; 75 MG/1; MG/1
100 CAPSULE ORAL 2 TIMES DAILY
Qty: 10 CAP | Refills: 0 | Status: SHIPPED | OUTPATIENT
Start: 2019-06-13 | End: 2019-06-18

## 2019-06-14 NOTE — PROGRESS NOTES
Subjective:      Reta Horvath is a 58 y.o. female who presents with UTI (fatigue X1 week)      - This is a pleasant and non toxic appearing 58 y.o. female with malaise x 1 wk, says in past when she gets this it is from a UTI. No urinary symptoms. Or NVFC          ALLERGIES:  Bactrim [sulfamethoxazole w-trimethoprim]; Codeine; Vicodin [hydrocodone-acetaminophen]; Erythromycin; and Nsaids     PMH:  Past Medical History:   Diagnosis Date   • Other specified symptom associated with female genital organs    • Psychiatric problem     depression   • Renal disorder     not an issue at this (from NSAIDS 2010)   • Unspecified urinary incontinence         PSH:  Past Surgical History:   Procedure Laterality Date   • APPENDECTOMY LAPAROSCOPIC  11/13/2014    Performed by Alethea Mike M.D. at SURGERY [facility]   • HYSTERECTOMY ROBOTIC  4/9/2013    Performed by Juanita Wade M.D. at SURGERY [facility]S   • BLADDER SLING FEMALE  4/9/2013    Performed by Juanita Wade M.D. at SURGERY [facility]   • CYSTOSCOPY  4/9/2013    Performed by Juanita Wade M.D. at SURGERY [facility]   • OTHER  1968    T&A uvula removal       MEDS:    Current Outpatient Prescriptions:   •  raNITIdine HCl (ZANTAC PO), Take  by mouth., Disp: , Rfl:   •  nitrofurantoin monohyd macro (MACROBID) 100 MG Cap, Take 1 Cap by mouth 2 times a day for 5 days., Disp: 10 Cap, Rfl: 0  •  Calcium-Magnesium-Vitamin D (CALCIUM 500 PO), Take  by mouth., Disp: , Rfl:   •  Multiple Vitamin (MULTI-VITAMIN PO), Take  by mouth every day., Disp: , Rfl:   •  duloxetine (CYMBALTA) 20 MG CPEP, Take 60 mg by mouth every day., Disp: , Rfl:   •  cetirizine (ZYRTEC) 10 MG TABS, Take 10 mg by mouth every day., Disp: , Rfl:   •  montelukast (SINGULAIR) 10 MG TABS, Take 10 mg by mouth every day., Disp: , Rfl:   •  ESTROGENS CONJUGATED PO, Take 1 mg by mouth. estrogile, Disp: , Rfl:     ** I have documented what I find to be significant in regards to  "past medical, social, family and surgical history  in my HPI or under PMH/PSH/FH review section, otherwise it is contributory **         HPI    Review of Systems   Constitutional: Positive for malaise/fatigue.   All other systems reviewed and are negative.         Objective:     /76   Pulse 89   Temp 36.7 °C (98.1 °F)   Ht 1.727 m (5' 8\")   Wt 97.3 kg (214 lb 6.4 oz)   SpO2 100%   BMI 32.60 kg/m²      Physical Exam   Constitutional: She appears well-developed. No distress.   HENT:   Head: Normocephalic and atraumatic.   Neck: Neck supple.   Cardiovascular: Regular rhythm.    No murmur heard.  Pulmonary/Chest: Effort normal. No respiratory distress.   Abdominal: Soft. There is no tenderness.   Neurological: She is alert. She exhibits normal muscle tone.   Skin: Skin is warm and dry.   Psychiatric: She has a normal mood and affect. Judgment normal.   Nursing note and vitals reviewed.              Assessment/Plan:         1. Malaise and fatigue  POCT Urinalysis [[id number]]    URINE CULTURE(NEW)    nitrofurantoin monohyd macro (MACROBID) 100 MG Cap             Dx & d/c instructions discussed w/ patient and/or family members.     Follow up with PCP (or here if PCP unavailable) in 2-3 days if symptoms not improving, ER if feeling/getting worse.    Any realistic and/or common medication side effects that may have been given today(i.e. Rash, GI upset/constipation, sedation, elevation of BP or blood sugars) reviewed.     Patient left in stable condition            "

## 2019-06-16 LAB
BACTERIA UR CULT: NORMAL
SIGNIFICANT IND 70042: NORMAL
SITE SITE: NORMAL
SOURCE SOURCE: NORMAL

## 2019-07-13 ENCOUNTER — OFFICE VISIT (OUTPATIENT)
Dept: URGENT CARE | Facility: PHYSICIAN GROUP | Age: 59
End: 2019-07-13
Payer: COMMERCIAL

## 2019-07-13 VITALS
DIASTOLIC BLOOD PRESSURE: 92 MMHG | SYSTOLIC BLOOD PRESSURE: 144 MMHG | HEIGHT: 68 IN | TEMPERATURE: 98.8 F | WEIGHT: 200 LBS | HEART RATE: 94 BPM | OXYGEN SATURATION: 95 % | BODY MASS INDEX: 30.31 KG/M2

## 2019-07-13 DIAGNOSIS — S39.012A LOW BACK STRAIN, INITIAL ENCOUNTER: Primary | ICD-10-CM

## 2019-07-13 PROCEDURE — 99214 OFFICE O/P EST MOD 30 MIN: CPT | Performed by: NURSE PRACTITIONER

## 2019-07-13 RX ORDER — CYCLOBENZAPRINE HCL 10 MG
10 TABLET ORAL 3 TIMES DAILY PRN
Qty: 30 TAB | Refills: 0 | Status: SHIPPED | OUTPATIENT
Start: 2019-07-13 | End: 2022-04-19

## 2019-07-13 RX ORDER — PSEUDOEPHEDRINE HCL 30 MG
60 TABLET ORAL EVERY 4 HOURS PRN
COMMUNITY
End: 2022-04-19

## 2019-07-13 RX ORDER — DULOXETIN HYDROCHLORIDE 60 MG/1
CAPSULE, DELAYED RELEASE ORAL
COMMUNITY
Start: 2019-06-03

## 2019-07-13 RX ORDER — MELOXICAM 7.5 MG/1
15 TABLET ORAL DAILY
Qty: 30 TAB | Refills: 1 | Status: SHIPPED | OUTPATIENT
Start: 2019-07-13 | End: 2022-04-19

## 2019-07-13 RX ORDER — FEXOFENADINE HCL 60 MG/1
60 TABLET, FILM COATED ORAL DAILY
COMMUNITY

## 2019-07-13 ASSESSMENT — ENCOUNTER SYMPTOMS
PARESIS: 0
NEUROLOGICAL NEGATIVE: 1
FOCAL WEAKNESS: 0
GASTROINTESTINAL NEGATIVE: 1
RESPIRATORY NEGATIVE: 1
TINGLING: 0
SENSORY CHANGE: 0
CARDIOVASCULAR NEGATIVE: 1
CONSTITUTIONAL NEGATIVE: 1
BOWEL INCONTINENCE: 0
BACK PAIN: 1
WEAKNESS: 0
NUMBNESS: 0

## 2019-07-13 NOTE — PROGRESS NOTES
Subjective:     Reta Horvath is a 58 y.o. female who presents for Low Back Pain (x2 days. Low back pain after bending over.)       Back Pain   This is a new problem. The problem has been gradually worsening since onset. The pain does not radiate. Pertinent negatives include no bladder incontinence, bowel incontinence, numbness, paresis, tingling or weakness.     Patient states that a few days ago she was doing a lot of yard work and she was also bike riding.  Yesterday she states she was bending over when she felt sudden pain in her left lower back.  Denies numbness, tingling, bowel/bladder dysfunction, focal weakness, or other symptoms.  Prior therapy: Meloxicam, lidocaine patch with mild relief in the pain.  Reports a history of straining her left lower back, denies other injury or surgeries.    PMH:  has a past medical history of Other specified symptom associated with female genital organs; Psychiatric problem; Renal disorder; and Unspecified urinary incontinence. She also has no past medical history of Breast cancer (HCC).    MEDS:   Current Outpatient Prescriptions:   •  DULoxetine (CYMBALTA) 60 MG Cap DR Particles delayed-release capsule, , Disp: , Rfl:   •  pseudoephedrine (SUDAFED) 30 MG Tab, Take 60 mg by mouth every four hours as needed for Congestion., Disp: , Rfl:   •  fexofenadine (ALLEGRA) 60 MG Tab, Take 60 mg by mouth every day., Disp: , Rfl:   •  meloxicam (MOBIC) 7.5 MG Tab, Take 2 Tabs by mouth every day., Disp: 30 Tab, Rfl: 1  •  cyclobenzaprine (FLEXERIL) 10 MG Tab, Take 1 Tab by mouth 3 times a day as needed for Muscle Spasms., Disp: 30 Tab, Rfl: 0  •  raNITIdine HCl (ZANTAC PO), Take  by mouth., Disp: , Rfl:   •  Calcium-Magnesium-Vitamin D (CALCIUM 500 PO), Take  by mouth., Disp: , Rfl:   •  Multiple Vitamin (MULTI-VITAMIN PO), Take  by mouth every day., Disp: , Rfl:   •  montelukast (SINGULAIR) 10 MG TABS, Take 10 mg by mouth every day., Disp: , Rfl:   •  ESTROGENS CONJUGATED PO, Take 1 mg  "by mouth. estrogile, Disp: , Rfl:   •  duloxetine (CYMBALTA) 20 MG CPEP, Take 60 mg by mouth every day., Disp: , Rfl:   •  cetirizine (ZYRTEC) 10 MG TABS, Take 10 mg by mouth every day., Disp: , Rfl:     ALLERGIES:   Allergies   Allergen Reactions   • Bactrim [Sulfamethoxazole W-Trimethoprim] Rash     Hands to forearms B/L rash since starting Bactrim   • Codeine Vomiting   • Vicodin [Hydrocodone-Acetaminophen] Vomiting   • Erythromycin Vomiting   • Nsaids      Decreased kidney function     SURGHX:   Past Surgical History:   Procedure Laterality Date   • APPENDECTOMY LAPAROSCOPIC  11/13/2014    Performed by Alethea Mike M.D. at SURGERY [facility]   • HYSTERECTOMY ROBOTIC  4/9/2013    Performed by Juanita Wade M.D. at SURGERY [facility]   • BLADDER SLING FEMALE  4/9/2013    Performed by Juanita Wade M.D. at SURGERY [facility]   • CYSTOSCOPY  4/9/2013    Performed by Juanita Wade M.D. at SURGERY [facility]   • OTHER  1968    T&A uvula removal     SOCHX:  reports that she quit smoking about 27 years ago. Her smoking use included Cigarettes. She has a 5.00 pack-year smoking history. She has never used smokeless tobacco. She reports that she drinks alcohol. She reports that she does not use drugs.     FH: Reviewed with patient, not pertinent to this visit.    Review of Systems   Constitutional: Negative.    Respiratory: Negative.    Cardiovascular: Negative.    Gastrointestinal: Negative.  Negative for bowel incontinence.   Genitourinary: Negative.  Negative for bladder incontinence.   Musculoskeletal: Positive for back pain.   Neurological: Negative.  Negative for tingling, sensory change, focal weakness, weakness and numbness.   All other systems reviewed and are negative.    Objective:     /92   Pulse 94   Temp 37.1 °C (98.8 °F)   Ht 1.727 m (5' 8\")   Wt 90.7 kg (200 lb)   SpO2 95%   BMI 30.41 kg/m²     Physical Exam   Constitutional: She is oriented to person, " place, and time. She appears well-developed and well-nourished. She is cooperative.  Non-toxic appearance. No distress.   HENT:   Right Ear: External ear normal.   Left Ear: External ear normal.   Nose: Nose normal.   Eyes: Conjunctivae and EOM are normal.   Neck: Normal range of motion.   Cardiovascular: Normal rate and intact distal pulses.    Pulmonary/Chest: Effort normal. No respiratory distress.   Musculoskeletal: She exhibits no deformity.        Lumbar back: She exhibits decreased range of motion (Due to pain), tenderness (Left), pain and spasm. She exhibits no swelling, no deformity and no laceration.        Back:    Neurological: She is alert and oriented to person, place, and time. She has normal strength. No sensory deficit.   Skin: Skin is warm, dry and intact.   Psychiatric: She has a normal mood and affect. Her behavior is normal.   Vitals reviewed.       Assessment/Plan:     1. Low back strain, initial encounter  - meloxicam (MOBIC) 7.5 MG Tab; Take 2 Tabs by mouth every day.  Dispense: 30 Tab; Refill: 1  - cyclobenzaprine (FLEXERIL) 10 MG Tab; Take 1 Tab by mouth 3 times a day as needed for Muscle Spasms.  Dispense: 30 Tab; Refill: 0    Allergies to NSAIDs noted.  Patient states she has tolerated meloxicam.  Rx sent electronically for meloxicam and Flexeril.  Advised on rest, gentle range of motion exercises, gentle stretching, and gentle massage.  May also use OTC acetaminophen and topical analgesics.    Patient advised close monitoring and to: Return for 1) Symptoms don't improve or worsen, or go to ER, 2) Follow up with primary care in 7-10 days.    Differential diagnosis, natural history, supportive care, and indications for immediate follow-up discussed. All questions answered. Patient agrees with the plan of care.

## 2019-09-14 ENCOUNTER — DOCUMENTATION (OUTPATIENT)
Dept: OCCUPATIONAL MEDICINE | Facility: CLINIC | Age: 59
End: 2019-09-14

## 2019-09-30 ENCOUNTER — IMMUNIZATION (OUTPATIENT)
Dept: SOCIAL WORK | Facility: CLINIC | Age: 59
End: 2019-09-30

## 2019-09-30 DIAGNOSIS — Z23 NEED FOR VACCINATION: ICD-10-CM

## 2019-09-30 PROCEDURE — 90686 IIV4 VACC NO PRSV 0.5 ML IM: CPT | Performed by: REGISTERED NURSE

## 2019-10-09 ENCOUNTER — EH NON-PROVIDER (OUTPATIENT)
Dept: OCCUPATIONAL MEDICINE | Facility: CLINIC | Age: 59
End: 2019-10-09

## 2020-12-08 ENCOUNTER — HOSPITAL ENCOUNTER (OUTPATIENT)
Facility: MEDICAL CENTER | Age: 60
End: 2020-12-08
Attending: PREVENTIVE MEDICINE
Payer: COMMERCIAL

## 2020-12-08 ENCOUNTER — EH NON-PROVIDER (OUTPATIENT)
Dept: OCCUPATIONAL MEDICINE | Facility: CLINIC | Age: 60
End: 2020-12-08

## 2020-12-08 DIAGNOSIS — Z11.59 ENCOUNTER FOR SCREENING FOR OTHER VIRAL DISEASES: ICD-10-CM

## 2020-12-08 DIAGNOSIS — Z11.59 ENCOUNTER FOR SCREENING FOR OTHER VIRAL DISEASES: Primary | ICD-10-CM

## 2020-12-08 LAB — COVID ORDER STATUS COVID19: NORMAL

## 2020-12-08 PROCEDURE — U0003 INFECTIOUS AGENT DETECTION BY NUCLEIC ACID (DNA OR RNA); SEVERE ACUTE RESPIRATORY SYNDROME CORONAVIRUS 2 (SARS-COV-2) (CORONAVIRUS DISEASE [COVID-19]), AMPLIFIED PROBE TECHNIQUE, MAKING USE OF HIGH THROUGHPUT TECHNOLOGIES AS DESCRIBED BY CMS-2020-01-R: HCPCS | Performed by: PREVENTIVE MEDICINE

## 2020-12-09 ENCOUNTER — TELEPHONE (OUTPATIENT)
Dept: OCCUPATIONAL MEDICINE | Facility: CLINIC | Age: 60
End: 2020-12-09

## 2020-12-09 LAB
SARS-COV-2 RNA RESP QL NAA+PROBE: DETECTED
SPECIMEN SOURCE: ABNORMAL

## 2020-12-09 NOTE — TELEPHONE ENCOUNTER
Calling in regards to pts. COVID-19 lab results. Requested a call back at [phone].  If results were received on [employer], pt. is advised to call employee screening line at [phone] for further instructions.

## 2020-12-16 DIAGNOSIS — Z23 NEED FOR VACCINATION: ICD-10-CM

## 2021-01-26 ENCOUNTER — NON-PROVIDER VISIT (OUTPATIENT)
Dept: URGENT CARE | Facility: PHYSICIAN GROUP | Age: 61
End: 2021-01-26

## 2021-01-26 DIAGNOSIS — Z11.1 PPD SCREENING TEST: ICD-10-CM

## 2021-01-27 NOTE — NON-PROVIDER
"Reta Horvath is a 60 y.o. female here for a non-provider visit for PPD placement -- Step 1 of 1    Reason for PPD:  work requirement    1. TB evaluation questionnaire completed by patient? Yes      -  If any answers marked yes did you contact a provider prior to placing? Not Indicated  2.  Patient notified to return to clinic for reading on: 01/28/21 after 11:53 or 01/29/21 before 11:53  3.  PPD Placement documentation completed on TB evaluation questionnaire? {YES (DEF)/NO:[id number]::\"Yes\"}  4.  Location of TB evaluation questionnaire filed: ***  "

## 2021-01-28 ENCOUNTER — NON-PROVIDER VISIT (OUTPATIENT)
Dept: URGENT CARE | Facility: PHYSICIAN GROUP | Age: 61
End: 2021-01-28

## 2021-01-28 LAB — TB WHEAL 3D P 5 TU DIAM: 0 MM

## 2021-03-04 ENCOUNTER — IMMUNIZATION (OUTPATIENT)
Dept: FAMILY PLANNING/WOMEN'S HEALTH CLINIC | Facility: IMMUNIZATION CENTER | Age: 61
End: 2021-03-04
Attending: FAMILY MEDICINE
Payer: COMMERCIAL

## 2021-03-04 DIAGNOSIS — Z23 ENCOUNTER FOR VACCINATION: Primary | ICD-10-CM

## 2021-03-04 DIAGNOSIS — Z23 NEED FOR VACCINATION: ICD-10-CM

## 2021-03-04 PROCEDURE — 0011A MODERNA SARS-COV-2 VACCINE: CPT

## 2021-03-04 PROCEDURE — 91301 MODERNA SARS-COV-2 VACCINE: CPT

## 2021-03-17 ENCOUNTER — EH NON-PROVIDER (OUTPATIENT)
Dept: OCCUPATIONAL MEDICINE | Facility: CLINIC | Age: 61
End: 2021-03-17

## 2021-03-17 DIAGNOSIS — Z02.89 ENCOUNTER FOR OCCUPATIONAL HEALTH EXAMINATION INVOLVING RESPIRATOR: ICD-10-CM

## 2021-03-17 PROCEDURE — 94375 RESPIRATORY FLOW VOLUME LOOP: CPT | Performed by: PREVENTIVE MEDICINE

## 2021-04-01 ENCOUNTER — IMMUNIZATION (OUTPATIENT)
Dept: OTHER | Facility: MEDICAL CENTER | Age: 61
End: 2021-04-01
Attending: INTERNAL MEDICINE
Payer: COMMERCIAL

## 2021-04-01 DIAGNOSIS — Z23 ENCOUNTER FOR VACCINATION: Primary | ICD-10-CM

## 2021-04-01 PROCEDURE — 0012A MODERNA SARS-COV-2 VACCINE: CPT

## 2021-04-01 PROCEDURE — 91301 MODERNA SARS-COV-2 VACCINE: CPT

## 2021-09-23 ENCOUNTER — IMMUNIZATION (OUTPATIENT)
Dept: OCCUPATIONAL MEDICINE | Facility: CLINIC | Age: 61
End: 2021-09-23
Payer: COMMERCIAL

## 2021-09-23 DIAGNOSIS — Z23 NEED FOR VACCINATION: Primary | ICD-10-CM

## 2021-09-23 PROCEDURE — 90686 IIV4 VACC NO PRSV 0.5 ML IM: CPT | Performed by: PREVENTIVE MEDICINE

## 2021-11-18 ENCOUNTER — PHARMACY VISIT (OUTPATIENT)
Dept: PHARMACY | Facility: MEDICAL CENTER | Age: 61
End: 2021-11-18
Payer: COMMERCIAL

## 2021-11-18 PROCEDURE — RXMED WILLOW AMBULATORY MEDICATION CHARGE: Performed by: INTERNAL MEDICINE

## 2021-11-18 RX ORDER — CX-024414 0.2 MG/ML
INJECTION, SUSPENSION INTRAMUSCULAR
Qty: 0.25 ML | Refills: 0 | OUTPATIENT
Start: 2021-11-18

## 2022-01-31 ENCOUNTER — NON-PROVIDER VISIT (OUTPATIENT)
Dept: URGENT CARE | Facility: PHYSICIAN GROUP | Age: 62
End: 2022-01-31

## 2022-01-31 DIAGNOSIS — Z11.1 PPD SCREENING TEST: ICD-10-CM

## 2022-01-31 PROCEDURE — 86580 TB INTRADERMAL TEST: CPT | Performed by: PHYSICIAN ASSISTANT

## 2022-02-02 ENCOUNTER — NON-PROVIDER VISIT (OUTPATIENT)
Dept: URGENT CARE | Facility: PHYSICIAN GROUP | Age: 62
End: 2022-02-02
Payer: COMMERCIAL

## 2022-02-02 DIAGNOSIS — Z11.1 ENCOUNTER FOR PPD SKIN TEST READING: ICD-10-CM

## 2022-02-02 LAB — TB WHEAL 3D P 5 TU DIAM: NORMAL MM

## 2022-04-19 ENCOUNTER — OFFICE VISIT (OUTPATIENT)
Dept: URGENT CARE | Facility: PHYSICIAN GROUP | Age: 62
End: 2022-04-19
Payer: COMMERCIAL

## 2022-04-19 VITALS
BODY MASS INDEX: 32.96 KG/M2 | TEMPERATURE: 97 F | WEIGHT: 210 LBS | RESPIRATION RATE: 16 BRPM | HEIGHT: 67 IN | OXYGEN SATURATION: 96 % | HEART RATE: 84 BPM | SYSTOLIC BLOOD PRESSURE: 114 MMHG | DIASTOLIC BLOOD PRESSURE: 70 MMHG

## 2022-04-19 DIAGNOSIS — R53.83 FATIGUE, UNSPECIFIED TYPE: ICD-10-CM

## 2022-04-19 DIAGNOSIS — Z87.440 HISTORY OF UTI: ICD-10-CM

## 2022-04-19 LAB
APPEARANCE UR: CLEAR
BILIRUB UR STRIP-MCNC: NEGATIVE MG/DL
COLOR UR AUTO: YELLOW
GLUCOSE UR STRIP.AUTO-MCNC: NEGATIVE MG/DL
KETONES UR STRIP.AUTO-MCNC: NEGATIVE MG/DL
LEUKOCYTE ESTERASE UR QL STRIP.AUTO: NEGATIVE
NITRITE UR QL STRIP.AUTO: NEGATIVE
PH UR STRIP.AUTO: 5.5 [PH] (ref 5–8)
PROT UR QL STRIP: NEGATIVE MG/DL
RBC UR QL AUTO: NEGATIVE
SP GR UR STRIP.AUTO: 1.02
UROBILINOGEN UR STRIP-MCNC: 0.2 MG/DL

## 2022-04-19 PROCEDURE — 81002 URINALYSIS NONAUTO W/O SCOPE: CPT | Performed by: FAMILY MEDICINE

## 2022-04-19 PROCEDURE — 99213 OFFICE O/P EST LOW 20 MIN: CPT | Performed by: FAMILY MEDICINE

## 2022-04-19 RX ORDER — TRIAMCINOLONE ACETONIDE 55 UG/1
SPRAY, METERED NASAL
COMMUNITY

## 2022-04-19 ASSESSMENT — ENCOUNTER SYMPTOMS
NAUSEA: 0
EYE REDNESS: 0
EYE DISCHARGE: 0
VOMITING: 0
WEIGHT LOSS: 0
MYALGIAS: 0

## 2022-04-19 NOTE — LETTER
April 19, 2022         Patient: Reta Horvath   YOB: 1960   Date of Visit: 4/19/2022           To Whom it May Concern:    Reta Horvath was seen in my clinic on 4/19/2022. Please excuse from work 4/19, 4/23, and 4/24/2022.      Sincerely,           Bradford Qiu M.D.  Electronically Signed

## 2022-04-19 NOTE — PROGRESS NOTES
"Subjective     Reta Horvath is a 61 y.o. female who presents with UTI (X 4 days)            4 days fatigue.  PMH UTI with similar symptoms.  No current urinary symptoms to include no burning, urgency, frequency, or hematuria.  No fever.  No flank pain.  Reta notes that fatigue may be related to work schedule and stress.  No other aggravating or alleviating factors.      Review of Systems   Constitutional: Negative for malaise/fatigue and weight loss.   Eyes: Negative for discharge and redness.   Gastrointestinal: Negative for nausea and vomiting.   Musculoskeletal: Negative for joint pain and myalgias.   Skin: Negative for itching and rash.              Objective     /70 (BP Location: Left arm, Patient Position: Sitting, BP Cuff Size: Large adult)   Pulse 84   Temp 36.1 °C (97 °F) (Temporal)   Resp 16   Ht 1.702 m (5' 7\")   Wt 95.3 kg (210 lb)   SpO2 96%   BMI 32.89 kg/m²      Physical Exam  Constitutional:       General: She is not in acute distress.     Appearance: She is well-developed.   HENT:      Head: Normocephalic and atraumatic.   Eyes:      Conjunctiva/sclera: Conjunctivae normal.   Cardiovascular:      Rate and Rhythm: Normal rate and regular rhythm.      Heart sounds: Normal heart sounds. No murmur heard.  Pulmonary:      Effort: Pulmonary effort is normal.      Breath sounds: Normal breath sounds. No wheezing.   Abdominal:      Tenderness: There is no abdominal tenderness. There is no right CVA tenderness or left CVA tenderness.   Skin:     General: Skin is warm and dry.      Findings: No rash.   Neurological:      Mental Status: She is alert and oriented to person, place, and time.                             Assessment & Plan      UA reviewed    1. Fatigue, unspecified type     2. History of UTI       Differential diagnosis, natural history, supportive care, and indications for immediate follow-up discussed at length.     No evidence of acute UTI.  Her primary care has ordered laboratory " studies to further evaluate for fatigue.

## 2022-07-08 ENCOUNTER — HOSPITAL ENCOUNTER (OUTPATIENT)
Dept: LAB | Facility: MEDICAL CENTER | Age: 62
End: 2022-07-08
Attending: FAMILY MEDICINE
Payer: COMMERCIAL

## 2022-07-08 LAB
25(OH)D3 SERPL-MCNC: 32 NG/ML (ref 30–100)
ALBUMIN SERPL BCP-MCNC: 4.8 G/DL (ref 3.2–4.9)
ALBUMIN/GLOB SERPL: 1.7 G/DL
ALP SERPL-CCNC: 129 U/L (ref 30–99)
ALT SERPL-CCNC: 8 U/L (ref 2–50)
ANION GAP SERPL CALC-SCNC: 13 MMOL/L (ref 7–16)
AST SERPL-CCNC: 22 U/L (ref 12–45)
BASOPHILS # BLD AUTO: 0.7 % (ref 0–1.8)
BASOPHILS # BLD: 0.03 K/UL (ref 0–0.12)
BILIRUB SERPL-MCNC: 0.5 MG/DL (ref 0.1–1.5)
BUN SERPL-MCNC: 15 MG/DL (ref 8–22)
CALCIUM SERPL-MCNC: 9.7 MG/DL (ref 8.5–10.5)
CHLORIDE SERPL-SCNC: 104 MMOL/L (ref 96–112)
CHOLEST SERPL-MCNC: 274 MG/DL (ref 100–199)
CO2 SERPL-SCNC: 24 MMOL/L (ref 20–33)
CREAT SERPL-MCNC: 1.05 MG/DL (ref 0.5–1.4)
EOSINOPHIL # BLD AUTO: 0.12 K/UL (ref 0–0.51)
EOSINOPHIL NFR BLD: 2.8 % (ref 0–6.9)
ERYTHROCYTE [DISTWIDTH] IN BLOOD BY AUTOMATED COUNT: 45 FL (ref 35.9–50)
FASTING STATUS PATIENT QL REPORTED: NORMAL
GFR SERPLBLD CREATININE-BSD FMLA CKD-EPI: 61 ML/MIN/1.73 M 2
GLOBULIN SER CALC-MCNC: 2.9 G/DL (ref 1.9–3.5)
GLUCOSE SERPL-MCNC: 97 MG/DL (ref 65–99)
HCT VFR BLD AUTO: 44.9 % (ref 37–47)
HDLC SERPL-MCNC: 54 MG/DL
HGB BLD-MCNC: 14.1 G/DL (ref 12–16)
IMM GRANULOCYTES # BLD AUTO: 0.01 K/UL (ref 0–0.11)
IMM GRANULOCYTES NFR BLD AUTO: 0.2 % (ref 0–0.9)
LDLC SERPL CALC-MCNC: 158 MG/DL
LYMPHOCYTES # BLD AUTO: 1.56 K/UL (ref 1–4.8)
LYMPHOCYTES NFR BLD: 36.5 % (ref 22–41)
MCH RBC QN AUTO: 29 PG (ref 27–33)
MCHC RBC AUTO-ENTMCNC: 31.4 G/DL (ref 33.6–35)
MCV RBC AUTO: 92.4 FL (ref 81.4–97.8)
MONOCYTES # BLD AUTO: 0.35 K/UL (ref 0–0.85)
MONOCYTES NFR BLD AUTO: 8.2 % (ref 0–13.4)
NEUTROPHILS # BLD AUTO: 2.2 K/UL (ref 2–7.15)
NEUTROPHILS NFR BLD: 51.6 % (ref 44–72)
NRBC # BLD AUTO: 0 K/UL
NRBC BLD-RTO: 0 /100 WBC
PLATELET # BLD AUTO: 369 K/UL (ref 164–446)
PMV BLD AUTO: 10.1 FL (ref 9–12.9)
POTASSIUM SERPL-SCNC: 4.5 MMOL/L (ref 3.6–5.5)
PROT SERPL-MCNC: 7.7 G/DL (ref 6–8.2)
RBC # BLD AUTO: 4.86 M/UL (ref 4.2–5.4)
SODIUM SERPL-SCNC: 141 MMOL/L (ref 135–145)
TRIGL SERPL-MCNC: 308 MG/DL (ref 0–149)
TSH SERPL DL<=0.005 MIU/L-ACNC: 1.89 UIU/ML (ref 0.38–5.33)
WBC # BLD AUTO: 4.3 K/UL (ref 4.8–10.8)

## 2022-07-08 PROCEDURE — 84443 ASSAY THYROID STIM HORMONE: CPT

## 2022-07-08 PROCEDURE — 80061 LIPID PANEL: CPT

## 2022-07-08 PROCEDURE — 36415 COLL VENOUS BLD VENIPUNCTURE: CPT

## 2022-07-08 PROCEDURE — 83036 HEMOGLOBIN GLYCOSYLATED A1C: CPT

## 2022-07-08 PROCEDURE — 80053 COMPREHEN METABOLIC PANEL: CPT

## 2022-07-08 PROCEDURE — 85025 COMPLETE CBC W/AUTO DIFF WBC: CPT

## 2022-07-08 PROCEDURE — 82306 VITAMIN D 25 HYDROXY: CPT

## 2022-07-09 LAB
EST. AVERAGE GLUCOSE BLD GHB EST-MCNC: 126 MG/DL
HBA1C MFR BLD: 6 % (ref 4–5.6)

## 2023-09-28 ENCOUNTER — IMMUNIZATION (OUTPATIENT)
Dept: OCCUPATIONAL MEDICINE | Facility: CLINIC | Age: 63
End: 2023-09-28

## 2023-09-28 DIAGNOSIS — Z23 NEED FOR VACCINATION: Primary | ICD-10-CM

## 2023-09-28 PROCEDURE — 90686 IIV4 VACC NO PRSV 0.5 ML IM: CPT | Performed by: PREVENTIVE MEDICINE

## 2023-12-26 ENCOUNTER — EH NON-PROVIDER (OUTPATIENT)
Dept: OCCUPATIONAL MEDICINE | Facility: CLINIC | Age: 63
End: 2023-12-26

## 2023-12-26 DIAGNOSIS — Z02.89 ENCOUNTER FOR OCCUPATIONAL HEALTH ASSESSMENT: ICD-10-CM

## 2023-12-26 PROCEDURE — 94375 RESPIRATORY FLOW VOLUME LOOP: CPT | Performed by: PREVENTIVE MEDICINE

## 2025-08-05 ENCOUNTER — HOSPITAL ENCOUNTER (OUTPATIENT)
Dept: LAB | Facility: MEDICAL CENTER | Age: 65
End: 2025-08-05
Attending: FAMILY MEDICINE
Payer: COMMERCIAL

## 2025-08-05 LAB
ALBUMIN SERPL BCP-MCNC: 4.4 G/DL (ref 3.2–4.9)
ALBUMIN/GLOB SERPL: 1.5 G/DL
ALP SERPL-CCNC: 137 U/L (ref 30–99)
ALT SERPL-CCNC: 20 U/L (ref 2–50)
ANION GAP SERPL CALC-SCNC: 12 MMOL/L (ref 7–16)
AST SERPL-CCNC: 33 U/L (ref 12–45)
BILIRUB SERPL-MCNC: 0.4 MG/DL (ref 0.1–1.5)
BUN SERPL-MCNC: 16 MG/DL (ref 8–22)
CALCIUM ALBUM COR SERPL-MCNC: 9.3 MG/DL (ref 8.5–10.5)
CALCIUM SERPL-MCNC: 9.6 MG/DL (ref 8.5–10.5)
CHLORIDE SERPL-SCNC: 104 MMOL/L (ref 96–112)
CHOLEST SERPL-MCNC: 264 MG/DL (ref 100–199)
CO2 SERPL-SCNC: 24 MMOL/L (ref 20–33)
CREAT SERPL-MCNC: 1.16 MG/DL (ref 0.5–1.4)
EST. AVERAGE GLUCOSE BLD GHB EST-MCNC: 126 MG/DL
FASTING STATUS PATIENT QL REPORTED: NORMAL
GFR SERPLBLD CREATININE-BSD FMLA CKD-EPI: 53 ML/MIN/1.73 M 2
GGT SERPL-CCNC: 48 U/L (ref 7–34)
GLOBULIN SER CALC-MCNC: 2.9 G/DL (ref 1.9–3.5)
GLUCOSE SERPL-MCNC: 107 MG/DL (ref 65–99)
HBA1C MFR BLD: 6 % (ref 4–5.6)
HDLC SERPL-MCNC: 56 MG/DL
LDLC SERPL CALC-MCNC: 150 MG/DL
POTASSIUM SERPL-SCNC: 4.2 MMOL/L (ref 3.6–5.5)
PROT SERPL-MCNC: 7.3 G/DL (ref 6–8.2)
SODIUM SERPL-SCNC: 140 MMOL/L (ref 135–145)
TRIGL SERPL-MCNC: 290 MG/DL (ref 0–149)

## 2025-08-05 PROCEDURE — 82977 ASSAY OF GGT: CPT

## 2025-08-05 PROCEDURE — 84080 ASSAY ALKALINE PHOSPHATASES: CPT

## 2025-08-05 PROCEDURE — 83036 HEMOGLOBIN GLYCOSYLATED A1C: CPT

## 2025-08-05 PROCEDURE — 80053 COMPREHEN METABOLIC PANEL: CPT

## 2025-08-05 PROCEDURE — 36415 COLL VENOUS BLD VENIPUNCTURE: CPT

## 2025-08-05 PROCEDURE — 84075 ASSAY ALKALINE PHOSPHATASE: CPT

## 2025-08-05 PROCEDURE — 80061 LIPID PANEL: CPT

## 2025-08-08 LAB
ALP BONE SERPL-CCNC: 73 U/L (ref 0–55)
ALP ISOS SERPL HS-CCNC: 0 U/L
ALP LIVER SERPL-CCNC: 79 U/L (ref 0–94)
ALP SERPL-CCNC: 152 U/L (ref 40–120)